# Patient Record
Sex: FEMALE | Race: WHITE | NOT HISPANIC OR LATINO | URBAN - METROPOLITAN AREA
[De-identification: names, ages, dates, MRNs, and addresses within clinical notes are randomized per-mention and may not be internally consistent; named-entity substitution may affect disease eponyms.]

---

## 2022-10-18 ENCOUNTER — TELEMEDICINE (OUTPATIENT)
Dept: PSYCHIATRY | Facility: CLINIC | Age: 22
End: 2022-10-18
Payer: COMMERCIAL

## 2022-10-18 DIAGNOSIS — F41.1 GENERALIZED ANXIETY DISORDER: Primary | ICD-10-CM

## 2022-10-18 PROCEDURE — 99212 OFFICE O/P EST SF 10 MIN: CPT | Performed by: PSYCHIATRY & NEUROLOGY

## 2022-10-18 RX ORDER — FLUVOXAMINE MALEATE 150 MG/1
1 CAPSULE, EXTENDED RELEASE ORAL
COMMUNITY
Start: 2022-10-02 | End: 2022-10-18 | Stop reason: SDUPTHER

## 2022-10-18 RX ORDER — FLUVOXAMINE MALEATE 150 MG/1
1 CAPSULE, EXTENDED RELEASE ORAL
Qty: 90 CAPSULE | Refills: 1 | Status: SHIPPED | OUTPATIENT
Start: 2022-10-18

## 2022-10-18 NOTE — PSYCH
Virtual Regular Visit    Verification of patient location:    Patient is located in the following state in which I hold an active license [place]      Assessment/Plan:  Continue luvox at current dose    Problem List Items Addressed This Visit    None     Visit Diagnoses     Generalized anxiety disorder    -  Primary    Relevant Medications    Fluvoxamine Maleate 150 MG CP24          Goals addressed in session: Goal 1          Reason for visit is   Chief Complaint   Patient presents with   • Medication Management        Encounter provider Zuleima Leyva MD    Provider located at [address] [facility]  [address]  [phone]      Recent Visits  No visits were found meeting these conditions  Showing recent visits within past 7 days and meeting all other requirements  Today's Visits  Date Type Provider Dept   10/18/22 Telemedicine Zuleima Leyva, [facility] today's visits and meeting all other requirements  Future Appointments  No visits were found meeting these conditions  Showing future appointments within next 150 days and meeting all other requirements       The patient was identified by name and date of birth  James Bobby was informed that this is a telemedicine visit and that the [facility] is [facility]ded and patient was informed this is a secure, HIPAA-complaint platform  She agrees to proceed     My office door was closed  No one else was in the room  and The patient was notified the following individuals were present in the room   She acknowledged consent and understanding of privacy and security of the video platform  The patient has agreed to participate and understands they can discontinue the visit at any time  Patient is aware this is a billable service  Subjective  James Bobby is a 23 y o  female with anxiety presents for regular f/u      Compliant with meds, denies SE  Anxiety - continues   Denies medical problems  Works at [address] [employer]       HPI   Anxiety - continues to have daily episodes of " overthinking"; gets overwhelmed and stressed - mostly work related  Sometimes gets anxious in social situations  Uses coping skills to control anxiety - " manageable"  Pt denied dose adjustment    History reviewed  No pertinent past medical history  No past surgical history on file  Current Outpatient Medications   Medication Sig Dispense Refill   • Fluvoxamine Maleate 150 MG CP24 Take 1 capsule (150 mg total) by mouth daily at bedtime 90 capsule 1     No current facility-administered medications for this visit  No Known Allergies    Review of Systems   Constitutional: Negative for activity change and appetite change  Psychiatric/Behavioral: Negative for sleep disturbance and suicidal ideas  Video Exam    There were no vitals filed for this visit  Physical Exam  Constitutional:       Appearance: Normal appearance  She is normal weight  Neurological:      Mental Status: She is alert  Psychiatric:         Attention and Perception: Attention normal          Mood and Affect: Affect normal  Mood is anxious  Speech: Speech normal          Behavior: Behavior normal  Behavior is cooperative  Thought Content: Thought content normal          Judgment: Judgment normal      TREATMENT PLAN (Medication Management Only)        [facility]    Name and Date of Birth:  John Rebolledo 23 y o  2000  Date of Treatment Plan: October 18, 2022  Diagnosis/Diagnoses:    1  Generalized anxiety disorder      Strengths/Personal Resources for Self-Care: taking medications as prescribed  Area/Areas of need (in own words): anxiety  1  Long Term Goal: continue improvement in anxiety  Target Date:6 months - 4/18/2023  Person/Persons responsible for completion of goal: Orestes Beebe  2    Short Term Objective (s) - How will we reach this goal?:   A  Provider new recommended medication/dosage changes and/or continue medication(s): continue current medications as prescribed Luvox  B  N/A   C  N/A  Target Date:6 months - 4/18/2023  Person/Persons Responsible for Completion of Goal: Darling Valentin  Progress Towards Goals: stable  Treatment Modality: medication management every 6 month  Review due 180 days from date of this plan: 6 months - 4/18/2023  Expected length of service: maintenance  My Physician/PA/NP and I have developed this plan together and I agree to work on the goals and objectives  I understand the treatment goals that were developed for my treatment           I spent 15 minutes directly with the patient during this visit    Start 1 19 pm  End 1 34 pm  Total 18

## 2022-10-31 ENCOUNTER — TELEPHONE (OUTPATIENT)
Dept: PSYCHIATRY | Facility: CLINIC | Age: 22
End: 2022-10-31

## 2023-05-18 DIAGNOSIS — F41.1 GENERALIZED ANXIETY DISORDER: ICD-10-CM

## 2023-05-18 RX ORDER — FLUVOXAMINE MALEATE 150 MG/1
1 CAPSULE, EXTENDED RELEASE ORAL
Qty: 30 CAPSULE | Refills: 0 | Status: SHIPPED | OUTPATIENT
Start: 2023-05-18

## 2023-06-22 ENCOUNTER — TELEMEDICINE (OUTPATIENT)
Dept: PSYCHIATRY | Facility: CLINIC | Age: 23
End: 2023-06-22
Payer: COMMERCIAL

## 2023-06-22 DIAGNOSIS — F41.1 GENERALIZED ANXIETY DISORDER: Primary | ICD-10-CM

## 2023-06-22 PROCEDURE — 99214 OFFICE O/P EST MOD 30 MIN: CPT | Performed by: PSYCHIATRY & NEUROLOGY

## 2023-06-22 RX ORDER — FLUVOXAMINE MALEATE 150 MG/1
1 CAPSULE, EXTENDED RELEASE ORAL
Qty: 90 CAPSULE | Refills: 1 | Status: SHIPPED | OUTPATIENT
Start: 2023-06-22

## 2023-06-22 NOTE — PATIENT INSTRUCTIONS
Continue luvox 150 mg   Pt declined dose increase  Pt declined therapy  Melatonin OTC for sleep recommended

## 2023-06-22 NOTE — PSYCH
"  Virtual Regular Visit    Verification of patient location:    Patient is located at Other in the following state in which I hold an active license [place]      Assessment/Plan:    Problem List Items Addressed This Visit    None      Goals addressed in session: Goal 1          Reason for visit is   Chief Complaint   Patient presents with   • Medication Management        Encounter provider Mayela Castrejon MD    Provider located at [address] [facility]  [address] [place] [address]  [address] [place] [address]  [phone]      Recent Visits  No visits were found meeting these conditions  Showing recent visits within past 7 days and meeting all other requirements  Today's Visits  Date Type Provider Dept   06/22/23 Telemedicine Mayela Castrejon, [address] [place] [address] today's visits and meeting all other requirements  Future Appointments  No visits were found meeting these conditions  Showing future appointments within next 150 days and meeting all other requirements       The patient was identified by name and date of birth  Jayden Morales was informed that this is a telemedicine visit and that the visit is being conducted throughthe [facility]  She agrees to proceed     My office door was closed  No one else was in the room  She acknowledged consent and understanding of privacy and security of the video platform  The patient has agreed to participate and understands they can discontinue the visit at any time  Patient is aware this is a billable service  Subjective  Jayden Morales is a 20 y o  female with anxiety presents for regular f/u   Compliant with meds, denies SE  Last appt in 10/2022  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  Denies acute medical problems           HPI   Anxiety - mostly \" the same\", bit sometimes in social situations gets shaky, nervous; sowmya to avoid the situation  Racing thoughts at night and difficult to fall asleep; interrupted sleep for " the last 2 months    History reviewed  No pertinent past medical history  History reviewed  No pertinent surgical history  Current Outpatient Medications   Medication Sig Dispense Refill   • Fluvoxamine Maleate 150 MG CP24 Take 1 capsule (150 mg total) by mouth daily at bedtime 30 capsule 0     No current facility-administered medications for this visit  No Known Allergies    Review of Systems   Constitutional: Negative for activity change and appetite change  Psychiatric/Behavioral: Positive for sleep disturbance  Negative for suicidal ideas  The patient is nervous/anxious  Video Exam    There were no vitals filed for this visit  Physical Exam  Constitutional:       Appearance: Normal appearance  She is normal weight  Neurological:      Mental Status: She is alert  Psychiatric:         Attention and Perception: Attention and perception normal          Mood and Affect: Affect normal  Mood is anxious  Speech: Speech normal          Behavior: Behavior normal  Behavior is cooperative  Thought Content:  Thought content normal          Judgment: Judgment normal           Visit Time    Visit Start Time: 3 37 pm   Visit Stop Time: 3 45 pm   Total Visit Duration: 15 minutes

## 2023-12-18 ENCOUNTER — TELEMEDICINE (OUTPATIENT)
Dept: PSYCHIATRY | Facility: CLINIC | Age: 23
End: 2023-12-18
Payer: COMMERCIAL

## 2023-12-18 DIAGNOSIS — F41.1 GENERALIZED ANXIETY DISORDER: ICD-10-CM

## 2023-12-18 PROCEDURE — 99213 OFFICE O/P EST LOW 20 MIN: CPT | Performed by: PSYCHIATRY & NEUROLOGY

## 2023-12-18 RX ORDER — FLUVOXAMINE MALEATE 150 MG/1
1 CAPSULE, EXTENDED RELEASE ORAL
Qty: 90 CAPSULE | Refills: 1 | Status: SHIPPED | OUTPATIENT
Start: 2023-12-18

## 2024-07-11 DIAGNOSIS — F41.1 GENERALIZED ANXIETY DISORDER: ICD-10-CM

## 2024-07-11 RX ORDER — FLUVOXAMINE MALEATE 150 MG/1
1 CAPSULE, EXTENDED RELEASE ORAL
Qty: 90 CAPSULE | Refills: 1 | Status: SHIPPED | OUTPATIENT
Start: 2024-07-11

## 2024-07-24 ENCOUNTER — TELEMEDICINE (OUTPATIENT)
Dept: PSYCHIATRY | Facility: CLINIC | Age: 24
End: 2024-07-24
Payer: COMMERCIAL

## 2024-07-24 DIAGNOSIS — F41.1 GENERALIZED ANXIETY DISORDER: Primary | ICD-10-CM

## 2024-07-24 PROCEDURE — 99214 OFFICE O/P EST MOD 30 MIN: CPT | Performed by: PSYCHIATRY & NEUROLOGY

## 2024-07-24 RX ORDER — FLUVOXAMINE MALEATE 150 MG/1
1 CAPSULE, EXTENDED RELEASE ORAL
Qty: 90 CAPSULE | Refills: 1 | Status: SHIPPED | OUTPATIENT
Start: 2024-07-24

## 2024-07-24 NOTE — PSYCH
"  Virtual Regular Visit    Verification of patient location:    Patient is located at Other in the following state in which I hold an active license [place]      Assessment/Plan:    Problem List Items Addressed This Visit    None      Goals addressed in session: Goal 1          Reason for visit is   Chief Complaint   Patient presents with    Medication Management        Encounter provider Shannon Young MD      Recent Visits  No visits were found meeting these conditions.  Showing recent visits within past 7 days and meeting all other requirements  Today's Visits  Date Type Provider Dept   07/24/24 Telemedicine Shannon Young MD [facility]   Showing today's visits and meeting all other requirements  Future Appointments  No visits were found meeting these conditions.  Showing future appointments within next 150 days and meeting all other requirements       The patient was identified by name and date of birth. Brenda Griffin was informed that this is a telemedicine visit and that the visit is being conducted throughthe [employer] Embedded platform. She agrees to proceed..  My office door was closed. No one else was in the room.  She acknowledged consent and understanding of privacy and security of the video platform. The patient has agreed to participate and understands they can discontinue the visit at any time.    Patient is aware this is a billable service.     Subjective  Brenda Griffin is a 24 y.o. female with anxiety presents for regular f/u  .  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 Body image related - \" all the time\"  Sometimes pt gets overwhelmed; \" freaking out\". Denies panic attacks  Mood - feeling depressed, low self esteem, low energy and low motivation - daily, \" on and off\". Pt does ADLs, stays social  Sleep - increased sometimes  History reviewed. No pertinent past " medical history.    History reviewed. No pertinent surgical history.    Current Outpatient Medications   Medication Sig Dispense Refill    Fluvoxamine Maleate 150 MG CP24 Take 1 capsule (150 mg total) by mouth daily at bedtime 90 capsule 1     No current facility-administered medications for this visit.        No Known Allergies    Review of Systems   Constitutional:  Negative for activity change and appetite change.   Psychiatric/Behavioral:  Positive for dysphoric mood. Negative for sleep disturbance and suicidal ideas. The patient is nervous/anxious.        Video Exam    There were no vitals filed for this visit.    Physical Exam  Constitutional:       Appearance: Normal appearance. She is normal weight.   Neurological:      Mental Status: She is alert.   Psychiatric:         Attention and Perception: Attention and perception normal.         Mood and Affect: Mood is anxious and depressed. Affect is blunt.         Speech: Speech normal.         Behavior: Behavior normal. Behavior is cooperative.         Thought Content: Thought content normal.         Judgment: Judgment normal.          Visit Time    Visit Start Time: 11.27 am   Visit Stop Time: 11.42 am   Total Visit Duration:  28 minutes

## 2024-07-31 ENCOUNTER — TELEPHONE (OUTPATIENT)
Dept: PSYCHIATRY | Facility: CLINIC | Age: 24
End: 2024-07-31

## 2024-07-31 NOTE — TELEPHONE ENCOUNTER
Called and left message for client offering follow-up medication check appt. with Dr. Shannon Young on 1/15/25 at 4:00 pm.

## 2025-01-13 ENCOUNTER — TELEPHONE (OUTPATIENT)
Dept: PSYCHIATRY | Facility: CLINIC | Age: 25
End: 2025-01-13

## 2025-01-13 NOTE — TELEPHONE ENCOUNTER
Writer called client and left voicemail letting her know that the [facility] New Patient [facility] and need to be completed for her 1/15/25 appt with Dr. Young.

## 2025-01-15 ENCOUNTER — TELEMEDICINE (OUTPATIENT)
Dept: PSYCHIATRY | Facility: CLINIC | Age: 25
End: 2025-01-15
Payer: COMMERCIAL

## 2025-01-15 DIAGNOSIS — F41.1 GENERALIZED ANXIETY DISORDER: Primary | ICD-10-CM

## 2025-01-15 PROCEDURE — 99213 OFFICE O/P EST LOW 20 MIN: CPT | Performed by: PSYCHIATRY & NEUROLOGY

## 2025-01-15 RX ORDER — FLUVOXAMINE MALEATE 150 MG/1
1 CAPSULE, EXTENDED RELEASE ORAL
Qty: 90 CAPSULE | Refills: 1 | Status: SHIPPED | OUTPATIENT
Start: 2025-01-15

## 2025-01-15 NOTE — BH TREATMENT PLAN
TREATMENT PLAN (Medication Management Only)        [facility]    Name and Date of Birth:  Brenda Griffin 24 y.o. 2000  Date of Treatment Plan: January 15, 2025  Diagnosis/Diagnoses:    1. Generalized anxiety disorder      Strengths/Personal Resources for Self-Care: taking medications as prescribed, motivation for treatment.  Area/Areas of need (in own words): anxiety  1. Long Term Goal: maintain control of anxiety.  Target Date:6 months - 7/15/2025  Person/Persons responsible for completion of goal: Brenda  2.  Short Term Objective (s) - How will we reach this goal?:   A. Provider new recommended medication/dosage changes and/or continue medication(s): continue current medications as prescribed Luvox.  B. N/A.  C. N/A.  Target Date:6 months - 7/15/2025  Person/Persons Responsible for Completion of Goal: Brenda  Progress Towards Goals: continuing treatment  Treatment Modality: medication management every 6 months  Review due 180 days from date of this plan: 6 months - 7/15/2025  Expected length of service: ongoing treatment  My Physician/PA/NP and I have developed this plan together and I agree to work on the goals and objectives. I understand the treatment goals that were developed for my treatment.

## 2025-01-15 NOTE — PSYCH
Virtual Regular Visit    Verification of patient location:    Patient is located at Other in the following state in which I hold an active license [place]      Assessment/Plan:  Assessment & Plan  Generalized anxiety disorder    Orders:    Fluvoxamine Maleate 150 MG CP24; Take 1 capsule (150 mg total) by mouth daily at bedtime       Problem List Items Addressed This Visit    None  Visit Diagnoses         Generalized anxiety disorder    -  Primary            Goals addressed in session: Goal 1     Depression Follow-up Plan Completed: Not applicable    Reason for visit is   Chief Complaint   Patient presents with    Medication Management        Encounter provider Shannon Young MD      Recent Visits  Date Type Provider Dept   [facility]   Showing recent visits within past 7 days and meeting all other requirements  Today's Visits  Date Type Provider Dept   01/15/25 [facility]medicine [facility]   Showing today's visits and meeting all other requirements  Future Appointments  No visits were found meeting these conditions.  Showing future appointments within next 150 days and meeting all other requirements       The patient was identified by name and date of birth. Brenda Griffin was informed that this is a telemedicine visit and that the visit is being conducted throughthe Epic Embedded platform. She agrees to proceed..  My office door was closed. No one else was in the room.  She acknowledged consent and understanding of privacy and security of the video platform. The patient has agreed to participate and understands they can discontinue the visit at any time.    Patient is aware this is a billable service.     Subjective  Brenda Griffin is a 24 y.o. female with anxiety presents for regular f/u .  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  Episodes of feeling sad and hopeless- 2-3 x week, for 1-2 hrs  Pt feels better when busy  Sleep - good   History reviewed. No pertinent past medical history.    History reviewed. No pertinent surgical history.    Current Outpatient Medications   Medication Sig Dispense Refill    Fluvoxamine Maleate 150 MG CP24 Take 1 capsule (150 mg total) by mouth daily at bedtime 90 capsule 1     No current facility-administered medications for this visit.        No Known Allergies    Review of Systems   Constitutional:  Negative for activity change and appetite change.   Psychiatric/Behavioral:  Negative for dysphoric mood, sleep disturbance and suicidal ideas. The patient is nervous/anxious.        Video Exam    There were no vitals filed for this visit.    Physical Exam  Constitutional:       Appearance: Normal appearance. She is normal weight.   Neurological:      Mental Status: She is alert.   Psychiatric:         Attention and Perception: Attention and perception normal.         Mood and Affect: Affect normal. Mood is anxious.         Speech: Speech normal.         Behavior: Behavior normal. Behavior is cooperative.         Thought Content: Thought content normal.         Judgment: Judgment normal.          Visit Time  Face to face  Visit Start Time: 4.00 pm   Visit Stop Time: 4.10 pm   Total Visit Duration:  20 minutes total spent in patient care

## 2025-01-16 ENCOUNTER — TELEPHONE (OUTPATIENT)
Dept: PSYCHIATRY | Facility: CLINIC | Age: 25
End: 2025-01-16

## 2025-01-16 NOTE — TELEPHONE ENCOUNTER
Writer called and scheduled 6 month follow up with Dr. Young, and let client know which forms will be  and need to be signed by then.     Client is requesting 5 pm appointments or later.

## 2025-01-20 ENCOUNTER — TELEPHONE (OUTPATIENT)
Dept: PSYCHIATRY | Facility: CLINIC | Age: 25
End: 2025-01-20

## 2025-01-20 NOTE — TELEPHONE ENCOUNTER
Left voicemail informing patient and/or parent/guardian of the Psych Encounter form needing to be signed as a requirement from the insurance company for billing purposes. Patient can access form via [employer] and sign electronically.     Please make patient aware this form must be signed for each visit as a requirement to continue future visits with provider.    Virtual Encounter form 1/15/25  call #1

## 2025-04-14 ENCOUNTER — TELEPHONE (OUTPATIENT)
Age: 25
End: 2025-04-14

## 2025-04-14 NOTE — TELEPHONE ENCOUNTER
Patients mother called in regarding wait list for talk therapy.    Writer unable to locate skilled nursing in chart.  Advised mom we cannot discuss.    Patients mother got upset stating patient needs to be seen and won't reach out on her own.     Writer informed mother that patient will need to call or be on the line to provide verbal consent to provide further assistance on patients care. Writer attempted to provide outside resources, walk in clinic and [facility] but mother declined.    She is requesting a  her back at [phone].    Writer advised mom that message will be sent to manager for further assistance.

## 2025-07-15 ENCOUNTER — TELEPHONE (OUTPATIENT)
Dept: PSYCHIATRY | Facility: CLINIC | Age: 25
End: 2025-07-15

## 2025-07-17 ENCOUNTER — TELEPHONE (OUTPATIENT)
Dept: PSYCHIATRY | Facility: CLINIC | Age: 25
End: 2025-07-17

## 2025-07-17 ENCOUNTER — TELEMEDICINE (OUTPATIENT)
Dept: PSYCHIATRY | Facility: CLINIC | Age: 25
End: 2025-07-17
Payer: COMMERCIAL

## 2025-07-17 DIAGNOSIS — F41.1 GENERALIZED ANXIETY DISORDER: ICD-10-CM

## 2025-07-17 PROCEDURE — 99213 OFFICE O/P EST LOW 20 MIN: CPT | Performed by: PSYCHIATRY & NEUROLOGY

## 2025-07-17 RX ORDER — FLUVOXAMINE MALEATE 150 MG/1
1 CAPSULE, EXTENDED RELEASE ORAL
Qty: 90 CAPSULE | Refills: 1 | Status: SHIPPED | OUTPATIENT
Start: 2025-07-17

## 2025-07-17 NOTE — BH TREATMENT PLAN
TREATMENT PLAN (Medication Management Only)        [facility]    Name and Date of Birth:  Brenda Griffin 25 y.o. 2000  MRN: [id number]  Date of Treatment Plan: July 17, 2025  Diagnosis/Diagnoses:    1. Generalized anxiety disorder      Strengths/Personal Resources for Self-Care: taking medications as prescribed, motivation for treatment.  Area/Areas of need (in own words): anxiety  1. Long Term Goal:   maintain control of anxiety.  Target Date:6 months - 1/17/2026  Person/Persons responsible for completion of goal: Brenda  2.  Short Term Objective (s) - How will we reach this goal?:   A.  Provider new recommended medication/dosage changes and/or continue medication(s): continue current medications as prescribed Luvox.  B.  N/A.  C.  N/A.  Target Date:6 months - 1/17/2026  Person/Persons Responsible for Completion of Goal: Brenda  Progress Towards Goals: stable  Treatment Modality: medication management every 6 months  Review due 180 days from date of this plan: 6 months - 1/17/2026  Expected length of service: ongoing treatment unless revised  My Physician/PA/NP and I have developed this plan together and I agree to work on the goals and objectives. I understand the treatment goals that were developed for my treatment.   Electronic Signatures: on file (unless signed below)    Shannon Young MD 07/17/25

## 2025-07-17 NOTE — PSYCH
"MEDICATION MANAGEMENT NOTE    Name: Brenda Griffin      : 2000      MRN: [id number]  Encounter Provider: Shannon Young MD  Encounter Date: 2025   Encounter department: [facility] OUTPATIENT    Insurance: Payor: [employer] [facility] / Plan: [place] [employer] / Product Type: Medicaid HMO /      Reason for Visit:   Chief Complaint   Patient presents with    Medication Management   :  Assessment & Plan  Generalized anxiety disorder    Orders:    Fluvoxamine Maleate 150 MG CP24; Take 1 capsule (150 mg total) by mouth daily at bedtime        Treatment Recommendations:    Educated about diagnosis and treatment modalities. Verbalizes understanding and agreement with the treatment plan.  Discussed self monitoring of symptoms, and symptom monitoring tools.  Discussed medications and if treatment adjustment was needed or desired.  Aware of 24 hour and weekend coverage for urgent situations accessed by calling [facility] main practice number  I am scheduling this patient out for greater than 3 months: Yes - Patient's stability of symptoms warrant this length of time or no significant changes to treatment plan    Medications Risks/Benefits:      Risks, Benefits And Possible Side Effects Of Medications:    Risks, benefits, and possible side effects of medications explained to Brenda and she (or legal representative) verbalizes understanding and agreement for treatment.    Controlled Medication Discussion:     Not applicable      History of Present Illness       Pt presents for regular f/u .  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 Denies panic attacks  Sleep, appetite - same  Review Of Systems: A " review of systems is obtained and is negative except for the pertinent positives listed in HPI/Subjective above.      Current Rating Scores:         Areas of Improvement: reviewed in HPI/Subjective Section      Past Medical History[1]  Past Surgical History[2]  Allergies: Allergies[3]    Current Outpatient Medications   Medication Instructions    Fluvoxamine Maleate 150 mg, Oral, Daily at bedtime        Substance Abuse History:    Tobacco, Alcohol and Drug Use History     Tobacco Use    Smoking status: Not on file    Smokeless tobacco: Not on file   Substance Use Topics    Alcohol use: Not on file    Drug use: Not on file          Social History:    Social History     Socioeconomic History    Marital status: Single     Spouse name: Not on file    Number of children: Not on file    Years of education: Not on file    Highest education level: Not on file   Occupational History    Not on file   Other Topics Concern    Not on file   Social History Narrative    Not on file        Family Psychiatric History:     Family History[4]    Medical History Reviewed by provider this encounter:  Allergies  Meds  Problems  Med Hx  Surg Hx  Fam Hx          Objective   There were no vitals taken for this visit.     Mental Status Evaluation:    Appearance age appropriate, casually dressed   Behavior cooperative, calm   Speech normal rate, normal volume   Mood euthymic   Affect constricted   Thought Processes organized, goal directed   Thought Content no overt delusions   Perceptual Disturbances: no auditory hallucinations, no visual hallucinations   Abnormal Thoughts  Risk Potential Suicidal ideation - None  Homicidal ideation - None  Potential for aggression - No   Orientation grossly oriented   Memory recent and remote memory grossly intact   Consciousness alert and awake   Attention Span Concentration Span attention span and concentration are age appropriate   Intellect appears to be of average intelligence   Insight intact    Judgement intact   Muscle Strength and  Gait unable to assess today due to virtual visit   Motor activity unable to assess today due to virtual visit   Language no difficulty naming common objects   Fund of Knowledge adequate knowledge of current events       Laboratory Results: I have personally reviewed all pertinent laboratory/tests results        Suicide/Homicide Risk Assessment:    Risk of Harm to Self:  Based on today's assessment, Brenda presents the following risk of harm to self: none    Risk of Harm to Others:  Based on today's assessment, Brenda presents the following risk of harm to others: none    The following interventions are recommended: Continue medication management.    Psychotherapy Provided:     Individual psychotherapy provided: No    Treatment Plan:    Completed and signed during the session: Yes - Treatment Plan done but not signed at time of office visit due to: Plan reviewed by video and verbal consent given due to virtual visit. Treatment Plan sent to patient via [email or web address] for signature.    Goals: Progress towards Treatment Plan goals - stable.    Depression Follow-up Plan Completed: Not applicable    Note Share:        Administrative Statements   Administrative Statements   Encounter provider Shannon Young MD    The Patient is located at Other and in the following state in which I hold an active license [place].    The patient was identified by name and date of birth. Brenda Griffin was informed that this is a telemedicine visit and that the visit is being conducted through the Epic Embedded platform. She agrees to proceed..  My office door was closed. No one else was in the room.  She acknowledged consent and understanding of privacy and security of the video platform. The patient has agreed to participate and understands they can discontinue the visit at any time.    I have spent a total time of 20 minutes in caring for this patient on the day of the visit/encounter including Risks and  benefits of tx options, Instructions for management, Documenting in the medical record, and Reviewing/placing orders in the medical record (including tests, medications, and/or procedures), not including the time spent for establishing the audio/video connection.    Visit Time  Face to face  Visit Start Time: 5.00 pm   Visit Stop Time: 5.09 pm   Total Visit Duration: 20 minutes total spent in patient care        Shannon Young MD 07/17/25       [1] No past medical history on file.  [2] No past surgical history on file.  [3] No Known Allergies  [4] No family history on file.

## 2025-07-22 ENCOUNTER — TELEPHONE (OUTPATIENT)
Dept: PSYCHIATRY | Facility: CLINIC | Age: 25
End: 2025-07-22

## 2025-07-22 ENCOUNTER — TELEPHONE (OUTPATIENT)
Age: 25
End: 2025-07-22

## 2025-07-22 NOTE — TELEPHONE ENCOUNTER
Left voicemail informing patient and/or parent/guardian of the Psych Encounter form needing to be signed as a requirement from the insurance company for billing purposes. Patient can access form via [employer] and sign electronically.     Please make patient aware this form must be signed for each visit as a requirement to continue future visits with provider.    Virtual Encounter form 7/17/25 call #[phone]

## 2025-07-23 ENCOUNTER — TELEPHONE (OUTPATIENT)
Age: 25
End: 2025-07-23

## 2025-08-05 ENCOUNTER — TELEPHONE (OUTPATIENT)
Dept: PSYCHIATRY | Facility: CLINIC | Age: 25
End: 2025-08-05

## 2025-08-17 ENCOUNTER — OFFICE VISIT (OUTPATIENT)
Age: 25
End: 2025-08-17

## 2025-08-17 VITALS
BODY MASS INDEX: 31.89 KG/M2 | TEMPERATURE: 98.9 F | HEART RATE: 115 BPM | WEIGHT: 180 LBS | DIASTOLIC BLOOD PRESSURE: 56 MMHG | HEIGHT: 63 IN | OXYGEN SATURATION: 97 % | SYSTOLIC BLOOD PRESSURE: 99 MMHG | RESPIRATION RATE: 20 BRPM

## 2025-08-17 DIAGNOSIS — J02.9 SORE THROAT: ICD-10-CM

## 2025-08-17 DIAGNOSIS — J02.0 STREP PHARYNGITIS: Primary | ICD-10-CM

## 2025-08-17 LAB — S PYO AG THROAT QL: POSITIVE

## 2025-08-17 PROCEDURE — PBNCHG PB NO CHARGE PLACEHOLDER: Performed by: EMERGENCY MEDICINE
